# Patient Record
Sex: FEMALE | Employment: FULL TIME | URBAN - METROPOLITAN AREA
[De-identification: names, ages, dates, MRNs, and addresses within clinical notes are randomized per-mention and may not be internally consistent; named-entity substitution may affect disease eponyms.]

---

## 2021-06-02 ENCOUNTER — HOSPITAL ENCOUNTER (OUTPATIENT)
Dept: PHYSICAL THERAPY | Age: 41
Discharge: HOME | End: 2021-06-02
Payer: COMMERCIAL

## 2021-06-02 PROCEDURE — 97161 PT EVAL LOW COMPLEX 20 MIN: CPT

## 2021-06-02 PROCEDURE — 97530 THERAPEUTIC ACTIVITIES: CPT

## 2021-06-02 PROCEDURE — 97112 NEUROMUSCULAR REEDUCATION: CPT

## 2021-06-02 NOTE — PROGRESS NOTES
PF Daily Treatment Note  Patient Name: Hanna Kaye  Date:2021  []  Patient  Verified  Insurance:Payor: Darryl Hill / Plan: [address] [place] [address] [place] PPO / Product Type: PPO /   In time:9:50  Out time: 10:28  Total Treatment Time (min): 38  Total Timed Codes (min): 23  1:1 Treatment Time ( only): 38   Visit #: 1 of 12    Treatment Area: [x] Pelvic Floor     [] Other:    SUBJECTIVE  Pain Level (0-10 scale): 0/10  Any medication changes, allergies to medications, adverse drug reactions, diagnosis change, or new procedure performed?: [x] No    [] Yes (see summary sheet for update)    Ms. Hanna Kaye is a 46 y/o, F who present with c/o mixed UI. Pt reports chronic problem which aggravated about 1 year ago; pt recalled decreased frequency of exercises/work out and additional weight gain since Covid started. Pt had prolong and heavy period for about 1 month in March when MD suggested PF PT to manage UI problem. Pt reports min leakage with urgency, mostly when getting closed to her house door/bathroom door. Pt also notes some leakage with coughing/sneezing. Pt reports mostly lower back pain with most activities, denies any pelvic pain, denies numbness tingling of B LEs, and denies any bowel problem. Pelvic Floor Dysfunction Evaluation    Musculoskeletal Screen:    Skin Integrity:  [x] Healthy [x] Red  [] Labia Atrophy [] Fragile    Sensation: [x] Intact [] Diminished:    Muscle Bulk: [x] Symmetrical  [] Well-developed [] Atrophied:  []L   []R   []B    Prolapse: [] Cystocele:   [] Rectocele:    PERF Score (Performance/Endurance/Repetitions/Flicks)   P: 1 E: R: F: Total:    Patient has failed previous pelvic floor muscle training?   [] Yes    [] No    EMG Evaluation:  [] N/A [] Deferred secondary to:    Channel A: Electrode type:  [] Internal    [] Surface    [] Vaginal    [] Rectal  Channel B: Electrode location:    Baseline Resting Tone (1 minute)  Channel A (microvolts): Quality:  [] Normal [] Irradic [] Elevated  Channel B (microvolts): Slow Twitch: (10 second hold, 20 second rest)  Channel A (microvolts): Quality:[] Quick/slow rise [] Low net rise  Net rise (microvolts):   [] Slow Relaxation [] Incoordination       [] Unable to contract [] Fatigues at (sec):       [] Elevated baseline between contractions    Channel B (microvolts): Use of Accessory Muscles: [] Minimal  Net rise (microvolts):   [] Moderate  [] Excessive       [] No use of accessory muscles    Fast Twitch (3 second hold, 10 second rest)  Channel A (microvolts): Quality:[] Quick/slow rise [] Low net rise  Net rise (microvolts):   [] Slow Relaxation [] Incoordination       [] Unable to contract [] Fatigues at (sec):       [] Elevated baseline between contractions    Channel B (microvolts): Use of Accessory Muscles: [] Minimal  Net rise (microvolts):   [] Moderate  [] Excessive       [] No use of accessory muscles    Optional Tests:  Lower abdominal strength: /5    Comments/Additional Tests:      OBJECTIVE    15 eval       15 min Therapeutic Activity:  []  See flow sheet :Pt edu within scope of practice on prognosis, POC, PF muscles anatomy/physiology, PF PT,  healthy bladder function, bladder irritants. Rationale: Improve quality of pelvic floor contractions, Decrease resting tone of the pelvic floor, Increase tissue extensibility of the pelvic floor muscles, Inhibit abnormal muscle activity and Improve lumbosacral and coccygeal mobility in order to Increase urinary continence, Decrease urinary urgency, Increase ability to delay urination, Decrease frequency of urination, Decrease nocturia, Decrease bowel urgency, Improve frequency and ease of bowel movements, Improve ability to engage in sexual intercourse, Improve ability to undergo a gynecological exam and Improve ability to perform ADLs. 8 min Neuromuscular Re-education:  []  See flow sheet :quality PF muscles contraction, urge management.     Rationale: Improve quality of pelvic floor contractions, Decrease resting tone of the pelvic floor, Increase tissue extensibility of the pelvic floor muscles, Inhibit abnormal muscle activity and Improve lumbosacral and coccygeal mobility in order to Increase urinary continence, Decrease urinary urgency, Increase ability to delay urination, Decrease frequency of urination, Decrease nocturia, Decrease bowel urgency, Improve frequency and ease of bowel movements, Improve ability to engage in sexual intercourse, Improve ability to undergo a gynecological exam and Improve ability to perform ADLs. min Patient Education: [x] Review HEP    [] Progressed/Changed HEP based on:   [] positioning   [] body mechanics   [] transfers   [] heat/ice application        Other Objective/Functional Measures:   []baseline resting tone:   []slow twitch mms   []fast twitch mms    Pain Level (0-10 scale) post treatment: 0/10    ASSESSMENT/Changes in Function: see POC please    []  Decrease # of leaks   [] No change []  Improving [] Resolved     []  Decrease hypertonus [] No change []  Improving [] Resolved     []  Increase void interval [] No change []  Improving [] Resolved     []  Increase PF strength [] No change []  Improving [] Resolved     []  Increase PF endurance [] No change []  Improving [] Resolved     []  Increase endurance [] No change []  Improving [] Resolved     []  Decrease # of pads [] No change []  Improving [] Resolved     []  Decrease pain [] No change []  Improving [] Resolved       Patient will continue to benefit from skilled PT services to modify and progress therapeutic interventions, address functional mobility deficits, address ROM deficits, address strength deficits, analyze and address soft tissue restrictions, analyze and cue movement patterns, analyze and modify body mechanics/ergonomics, assess and modify postural abnormalities and instruct in home and community integration to attain remaining goals.      [x]  See Plan of Care         PLAN  []  Upgrade activities as tolerated     []  Continue plan of care  []  Update interventions per flow sheet       []  Discharge due to:_  []  Other:_      Jose Ballard, PT 6/2/2021  9:53 AM

## 2021-06-02 NOTE — PROGRESS NOTES
[facility]  [facility]  [place]  [address] [place] [address]  [phone] fax    Plan of Care/ Statement of Necessity for Physical Therapy Services    Patient name: Edis Hernandez Start of Care: 2021   Referral source: Valerio Urrutia NP : 1980    Medical Diagnosis: PFD (pelvic floor dysfunction) [M62.89]  Payor: Jose De Jesus Ponce / Plan: [place] [employer] [place] / Product Type: PPO /  Onset Date:aggravated about several months ago    Treatment Diagnosis: Mixed UI   Prior Hospitalization: see medical history Provider#: [id number]   Medications: Verified on Patient summary List    Comorbidities:  Weight change of more than 10lbs, asthma   Prior Level of Function: less leakage, ind with all mobility. .           The Plan of Care and following information is based on the information from the initial evaluation. Assessment/ bocanegra information: Ms. Edis Hernandez is a 46 y/o, F who present with c/o mixed UI. Pt reports chronic problem which aggravated about 1 year ago; pt recalled decreased frequency of exercises/work out and additional weight gain since Covid started. Pt had prolong and heavy period for about 1 month in March when MD suggested PF PT to manage UI problem. Pt reports min leakage with urgency, mostly when getting closed to her house door/bathroom door. Pt also notes some leakage with coughing/sneezing. Pt reports mostly lower back pain with most activities, denies any pelvic pain, denies numbness tingling of B LEs, and denies any bowel problem. Evaluation reveals patient with mod increased tone and poor strength of PF muscles, 1/5. She demonstrates significant challenge with coordination for appropriate contraction without Valsalva Maneuver. Also notes very mild instability of ant and post wall. PF muscles. Patient may benefit from physical therapy to address PF muscles, bladder and bowel function to improve her QOL.     Evaluation Complexity History MEDIUM  Complexity : 1-2 comorbidities / personal factors will impact the outcome/ POC ; Examination LOW Complexity : 1-2 Standardized tests and measures addressing body structure, function, activity limitation and / or participation in recreation  ;Presentation MEDIUM Complexity : Evolving with changing characteristics  ; Clinical Decision Making MEDIUM Complexity : FOTO score of 26-74  Overall Complexity Rating: LOW     Problem List: Pelvic pain/dysfunction, Decreased pelvic floor mm awareness, Decreased pelvic floor mm strength, Use of accessory muscles, Improper voiding habits, Hypertonus of pelvic floor and Urinary urgency    Treatment Plan may include any combination of the following:   Therapeutic exercise, Urge suppression techniques, Neuromuscular re-education, Manual therapy, Physical agent/modality and Patient education  Patient / Family readiness to learn indicated by: asking questions, trying to perform skills and interest    Persons(s) to be included in education: patient (P)    Barriers to Learning/Limitations: None    Patient Goal (s): stronger bladder and control    Patient Self Reported Health Status: fair    Rehabilitation Potential: good    Short Term Goals: To be accomplished in 4 weeks:  1. Patient will demonstrate accurate performance of home exercise program/pelvic floor contractions as adjunct to physical therapy clinic visits to promote healthy lifestyle and improved quality of life. Eval status: good understanding     2. Patient will Complete Bladder Diary for use in patient education and goal setting. Eval status: will be given for monitoring     3. Patient will have decreased pad usage to 1x/day max for increased patient comfort and increased quality of life. Eval status: 1-2 minipads everyday     Long Term Goals: To be accomplished in 8  weeks:  1. Patient will demonstrate independence in HEP for maintenance of pelvic floor program and improved quality of life. Eval status: good understanding     2. Patient will have decreased leakage episodes to <1-2 per week for increased quality of life. Eval status: multiple times a week, mostly due to urgency, mild with sneezing/coughin      3. Patient will have increased pelvic floor muscle motor performance by 1/2 to 1 grade for improved urinary continence and quality of life. Eval status: 1/5     4. Patient will have FOTO Urinary Problem score change of 11 points or more indicating improvement in function for icreased quality of life. Eval status: 49    Frequency / Duration: Patient to be seen 1-2 times per week for 8 weeks. Patient/ Caregiver education and instruction: Diagnosis, prognosis, Proper Voiding Habits, Diet, Pain Management, Exercises and Bladder Retraining      Beth Ridley, PT 6/2/2021 9:53 AM    ________________________________________________________________________    I certify that the above Therapy Services are being furnished while the patient is under my care. I agree with the treatment plan and certify that this therapy is necessary.     [de-identified] Signature:____________Date:_________TIME:________     [facility], NP  ** Signature, Date and Time must be completed for valid certification **      Please sign and return to [facility]  [facility]   [address] [facility]  [phone] fax

## 2021-06-07 ENCOUNTER — HOSPITAL ENCOUNTER (OUTPATIENT)
Dept: PHYSICAL THERAPY | Age: 41
Discharge: HOME | End: 2021-06-07
Payer: COMMERCIAL

## 2021-06-07 PROCEDURE — 97530 THERAPEUTIC ACTIVITIES: CPT

## 2021-06-07 PROCEDURE — 97112 NEUROMUSCULAR REEDUCATION: CPT

## 2021-06-07 NOTE — PROGRESS NOTES
PF DAILY TREATMENT NOTE 3-16    Patient Name: Jeffrey Pressley  Date:2021  : 1980  [x]  Patient  Verified  Payor: Emily Huddleston / [place] [employer] [place] / Product Type: PPO /    In time: 11:28  Out time: 12:00  Total Treatment Time (min): 32  Visit #: 2 of 12    Treatment Area: [x] Pelvic Floor     [] Other:    SUBJECTIVE  Pain Level (0-10 scale): 1/10  Any medication changes, allergies to medications, adverse drug reactions, diagnosis change, or new procedure performed?: [x] No    [] Yes (see summary sheet for update)  Subjective functional status/changes:   [] No changes reported  Pt reports having difficulty coordinating PF muscles contraction partially due to her menstrual cycle. Pt has a little soreness with her back today. OBJECTIVE     23 min Therapeutic Activity:  [x]  See flow sheet :    []  Increase Tissue extensibility        [x]  Assess fiber intake    [x]  Assess voiding habits  [x]  Assess bowel habits  [x]  Other: urge management, healthy bladder habit  Rationale: Improve quality of pelvic floor contractions, Decrease resting tone of the pelvic floor, Increase tissue extensibility of the pelvic floor muscles and Inhibit abnormal muscle activity in order to Increase urinary continence, Decrease urinary urgency, Increase ability to delay urination, Decrease frequency of urination, Decrease nocturia, Improve ability to engage in sexual intercourse, Improve ability to undergo a gynecological exam and Improve ability to perform ADLs.       9 min Neuromuscular Re-education:  [x]  See flow sheet :   []  Pelvic floor strengthening                 []  Pelvic floor downtraining  [x]  Quality pelvic floor contractions       [x]  Relaxation techniques  [x]  Urge suppression exercises  []  Other:  Rationale:  Improve quality of pelvic floor contractions, Decrease resting tone of the pelvic floor, Increase tissue extensibility of the pelvic floor muscles and Inhibit abnormal muscle activity in order to Increase urinary continence, Decrease urinary urgency, Increase ability to delay urination, Decrease frequency of urination, Decrease nocturia, Improve ability to engage in sexual intercourse, Improve ability to undergo a gynecological exam and Improve ability to perform ADLs. With   [] TE   [] TA   [] neuro  [] manual   [] other: Patient Education: [x] Review HEP    [] Progressed/Changed HEP based on:   [] positioning   [] body mechanics   [] transfers   [] heat/ice application    [] other:      Other Objective/Functional Measures:   []baseline resting tone:   []slow twitch mms   []fast twitch mms    Pain Level (0-10 scale) post treatment: 1/10    ASSESSMENT/Changes in Function: pt reports that she has to exaggerate exhalation to remind to breath during PF muscles contraction. She demonstrates good understanding of urge suppression and voiding mechanics after education. Will cont with down training next visit.      []  Decrease # of leaks   [] No change []  Improving [] Resolved     []  Decrease hypertonus [] No change []  Improving [] Resolved     []  Increase void interval [] No change []  Improving [] Resolved     []  Increase PF strength [] No change []  Improving [] Resolved     []  Increase PF endurance [] No change []  Improving [] Resolved     []  Increase endurance [] No change []  Improving [] Resolved     []  Decrease # of pads [] No change []  Improving [] Resolved     []  Decrease pain [] No change []  Improving [] Resolved     []  Increased coordination [] No change []  Improving [] Resolved     []  Increased Bowel Frequency [] No change []  Improving [] Resolved       Patient will continue to benefit from skilled PT services to modify and progress therapeutic interventions, address functional mobility deficits, address ROM deficits, address strength deficits, analyze and address soft tissue restrictions, analyze and cue movement patterns, analyze and modify body mechanics/ergonomics, assess and modify postural abnormalities and instruct in home and community integration to attain remaining goals. [x]  See Plan of Care  []  See progress note/recertification  []  See Discharge Summary         Progress towards goals / Updated goals:  Short Term Goals: To be accomplished in 4 weeks:  1. Patient will demonstrate accurate performance of home exercise program/pelvic floor contractions as adjunct to physical therapy clinic visits to promote healthy lifestyle and improved quality of life. Eval status: good understanding     2. Patient will Complete Bladder Diary for use in patient education and goal setting. Eval status: will be given for monitoring     3. Patient will have decreased pad usage to 1x/day max for increased patient comfort and increased quality of life. Eval status: 1-2 minipads everyday     Long Term Goals: To be accomplished in 8  weeks:  1. Patient will demonstrate independence in HEP for maintenance of pelvic floor program and improved quality of life. Eval status: good understanding     2. Patient will have decreased leakage episodes to <1-2 per week for increased quality of life. Eval status: multiple times a week, mostly due to urgency, mild with sneezing/coughin      3. Patient will have increased pelvic floor muscle motor performance by 1/2 to 1 grade for improved urinary continence and quality of life. Eval status: 1/5     4. Patient will have FOTO Urinary Problem score change of 11 points or more indicating improvement in function for icreased quality of life.   Eval status: 49    PLAN  [x]  Upgrade activities as tolerated     [x]  Continue plan of care  []  Update interventions per flow sheet       []  Discharge due to:_  []  Other:_      Ju Watts, PT 6/7/2021  8:11 AM    Future Appointments   Date Time Provider Hemalatha Watson   6/7/2021 11:15 AM Mare Faustin MMCPTPB [facility]   6/10/2021  8:15 AM Shashi Valerio XOLFSSHEMAR [facility]   6/16/2021  6:00 PM Mare Faustin AMDDJKA [facility]   6/21/2021  9:00 AM Laya Vidya MMCPTPB [facility]   6/28/2021  8:15 AM Angel COUCH MMCPTPB [facility]   7/6/2021  4:30 PM Laya Vidya GBKHPZN [facility]   7/12/2021  8:15 AM Laya Dasilva MMCPTPB [facility]   7/21/2021  8:15 AM Laya Dasilva MMCPTPB [facility]   7/26/2021  8:15 AM Leena Lewis MMCPTPB [facility]

## 2021-06-10 ENCOUNTER — APPOINTMENT (OUTPATIENT)
Dept: PHYSICAL THERAPY | Age: 41
End: 2021-06-10
Payer: COMMERCIAL

## 2021-06-16 ENCOUNTER — HOSPITAL ENCOUNTER (OUTPATIENT)
Dept: PHYSICAL THERAPY | Age: 41
Discharge: HOME | End: 2021-06-16
Payer: COMMERCIAL

## 2021-06-16 PROCEDURE — 97112 NEUROMUSCULAR REEDUCATION: CPT

## 2021-06-16 PROCEDURE — 97140 MANUAL THERAPY 1/> REGIONS: CPT

## 2021-06-16 PROCEDURE — 97530 THERAPEUTIC ACTIVITIES: CPT

## 2021-06-16 NOTE — PROGRESS NOTES
PF DAILY TREATMENT NOTE 3    Patient Name: Gerome Holter  Date:2021  : 1980  [x]  Patient  Verified  Payor: Kim Vance / Plan: [place] [employer] [place] / Product Type: PPO /    In time: 5:18  Out time:6:07  Total Treatment Time (min): 52  Visit #: 3 of 12    Treatment Area: [x] Pelvic Floor     [] Other:    SUBJECTIVE  Pain Level (0-10 scale): 0/10  Any medication changes, allergies to medications, adverse drug reactions, diagnosis change, or new procedure performed?: [x] No    [] Yes (see summary sheet for update)  Subjective functional status/changes:   [] No changes reported  Pt had active menstrual cycle with some cramping/spasm at this time. She couldn't perform PF coordination as much during the last several days. No pain but some pressure like \"indigestion\" feeling with her abdominal region.      OBJECTIVE    Modality rationale: decrease pain and increase tissue extensibility to improve the patients ability to tolerate ADLs   Min Type Additional Details    [] Estim:  []Unatt       []IFC  []Premod                        []Other:  []w/ice   []w/heat  Position:  Location:    [] Estim: []Att    []TENS instruct  []NMES                    []Other:  []w/US   []w/ice   []w/heat  Position:  Location:    []  Ultrasound: []Continuous   [] Pulsed                           []1MHz   []3MHz Position:  Location:   15 (10 min during MT) []  Ice     [x]  heat  []  Ice massage  []  Laser   []  Anodyne Position:  Location:   [] Skin assessment post-treatment:  []intact []redness- no adverse reaction    []redness  adverse reaction:       10 min Therapeutic Activity:  [x]  See flow sheet :    []  Increase Tissue extensibility        [x]  Assess fiber intake    [x]  Assess voiding habits  [x]  Assess bowel habits  [x]  Other: voiding interval, HEP updated   Rationale: Improve quality of pelvic floor contractions, Decrease resting tone of the pelvic floor, Increase tissue extensibility of the pelvic floor muscles, Inhibit abnormal muscle activity and Improve lumbosacral and coccygeal mobility in order to Increase urinary continence, Decrease urinary urgency, Increase ability to delay urination, Decrease frequency of urination, Decrease nocturia and Improve ability to perform ADLs. 27 min Neuromuscular Re-education:  [x]  See flow sheet :   []  Pelvic floor strengthening                 []  Pelvic floor downtraining  [x]  Quality pelvic floor contractions       [x]  Relaxation techniques  [x]  Urge suppression exercises  []  Other:  Rationale:  Improve quality of pelvic floor contractions, Decrease resting tone of the pelvic floor, Increase tissue extensibility of the pelvic floor muscles, Inhibit abnormal muscle activity and Improve lumbosacral and coccygeal mobility in order to Increase urinary continence, Decrease urinary urgency, Increase ability to delay urination, Decrease frequency of urination, Decrease nocturia and Improve ability to perform ADLs. 8 min Manual Therapy:  Abdominal MFR and massage   Rationale: Decrease resting tone of the pelvic floor, Inhibit abnormal muscle activity and abnormal pressure in order to Decrease urinary urgency and Improve ability to perform ADLs. The manual therapy interventions were performed at a separate and distinct time from the therapeutic activities interventions. With   [] TE   [] TA   [] neuro  [] manual   [] other: Patient Education: [x] Review HEP    [] Progressed/Changed HEP based on:   [] positioning   [] body mechanics   [] transfers   [] heat/ice application    [] other:      Other Objective/Functional Measures:   []baseline resting tone:   []slow twitch mms   []fast twitch mms   Improved ease with PF muscles coordination and contraction with hip add    Pain Level (0-10 scale) post treatment: 0/10    ASSESSMENT/Changes in Function: Pt reports limited progression partially due to persistent menstrual cycles.  She demonstrates good PF muscles coordination, good form and understanding with updated HEP. Improved abdominal pain/pressure with MT. Will cont to progress with internal manual and PF muscles down training as tolerated. []  Decrease # of leaks   [] No change []  Improving [] Resolved     []  Decrease hypertonus [] No change []  Improving [] Resolved     []  Increase void interval [] No change []  Improving [] Resolved     []  Increase PF strength [] No change []  Improving [] Resolved     []  Increase PF endurance [] No change []  Improving [] Resolved     []  Increase endurance [] No change []  Improving [] Resolved     []  Decrease # of pads [] No change []  Improving [] Resolved     []  Decrease pain [] No change []  Improving [] Resolved     []  Increased coordination [] No change []  Improving [] Resolved     []  Increased Bowel Frequency [] No change []  Improving [] Resolved       Patient will continue to benefit from skilled PT services to modify and progress therapeutic interventions, address functional mobility deficits, address ROM deficits, address strength deficits, analyze and address soft tissue restrictions, analyze and cue movement patterns, analyze and modify body mechanics/ergonomics, assess and modify postural abnormalities and instruct in home and community integration to attain remaining goals. [x]  See Plan of Care  []  See progress note/recertification  []  See Discharge Summary         Progress towards goals / Updated goals:  Short Term Goals: To be accomplished in 4 weeks:  1. Patient will demonstrate accurate performance of home exercise program/pelvic floor contractions as adjunct to physical therapy clinic visits to promote healthy lifestyle and improved quality of life. Eval status: good understanding  Current: fair compliance due to pain/discomfort with prolonged menstrual cycle 6-16-21     2. Patient will Complete Bladder Diary for use in patient education and goal setting. Eval status: will be given for monitoring     3.  Patient will have decreased pad usage to 1x/day max for increased patient comfort and increased quality of life. Eval status: 1-2 minipads everyday     Long Term Goals: To be accomplished in 8  weeks:  1. Patient will demonstrate independence in HEP for maintenance of pelvic floor program and improved quality of life. Eval status: good understanding     2. Patient will have decreased leakage episodes to <1-2 per week for increased quality of life. Eval status: multiple times a week, mostly due to urgency, mild with sneezing/coughin      3. Patient will have increased pelvic floor muscle motor performance by 1/2 to 1 grade for improved urinary continence and quality of life. Eval status: 1/5     4. Patient will have FOTO Urinary Problem score change of 11 points or more indicating improvement in function for icreased quality of life.   Eval status: 49    PLAN  [x]  Upgrade activities as tolerated     [x]  Continue plan of care  []  Update interventions per flow sheet       []  Discharge due to:_  []  Other:_      Dean Ying, PT 6/16/2021  9:31 AM    Future Appointments   Date Time Provider Hemalatha Watson   6/16/2021  6:00 PM Tyree Stapler [id number] [facility]   6/21/2021  9:00 AM Tyree Stapler [id number]   6/28/2021  8:15 AM Tyree Stapler [id number]   7/6/2021  4:30 PM Tyree Marceloler [id number] [facility]   7/12/2021  8:15 AM Tyree Stapler [id number] [facility]   7/21/2021  8:15 AM Shashi Rosas [id number] [facility]   7/26/2021  8:15 AM Tyree Marceloler [id number] [facility]

## 2021-06-21 ENCOUNTER — HOSPITAL ENCOUNTER (OUTPATIENT)
Dept: PHYSICAL THERAPY | Age: 41
Discharge: HOME | End: 2021-06-21
Payer: COMMERCIAL

## 2021-06-21 PROCEDURE — 97140 MANUAL THERAPY 1/> REGIONS: CPT

## 2021-06-21 PROCEDURE — 97110 THERAPEUTIC EXERCISES: CPT

## 2021-06-21 PROCEDURE — 97530 THERAPEUTIC ACTIVITIES: CPT

## 2021-06-21 NOTE — PROGRESS NOTES
PF DAILY TREATMENT NOTE 3-16    Patient Name: Vince Russo  Date:2021  : 1980  [x]  Patient      Payor: Millie Tamez / Plan: [place] [employer] [place] / Product Type: PPO /    In time: 9:04 Out time:9:44  Total Treatment Time (min): 40  Visit #: 4 of 12    Treatment Area: [x] Pelvic Floor     [] Other:    SUBJECTIVE  Pain Level (0-10 scale): 0/10  Any medication changes, allergies to medications, adverse drug reactions, diagnosis change, or new procedure performed?: [x] No    [] Yes (see summary sheet for update)  Subjective functional status/changes:   [] No changes reported  Pt reports usual voiding interval is 3-4 hours; urinary urgency occurs 1x every 2 days. She reports WNL with bowel and good compliance with HEP. OBJECTIVE      10 min Therapeutic Exercise:  [x] See flow sheet :   [x]  Pelvic floor strengthening                 [x]  Pelvic floor downtraining  []  Quality pelvic floor contractions       [x]  Relaxation techniques  [x]  Urge suppression exercises  []  Other:  Rationale: Increase pelvic floor muscle strength, Improve quality of pelvic floor contractions, Decrease resting tone of the pelvic floor, Increase tissue extensibility of the pelvic floor muscles, Inhibit abnormal muscle activity and Improve lumbosacral and coccygeal mobility in order to Increase urinary continence, Decrease urinary urgency, Increase ability to delay urination and Improve ability to perform ADLs.        15 min Therapeutic Activity:  []  See flow sheet :    []  Increase Tissue extensibility        [x]  Assess fiber intake    [x]  Assess voiding habits  [x]  Assess bowel habits  [x]  Other: HEP review   Rationale: Increase pelvic floor muscle strength, Improve quality of pelvic floor contractions, Decrease resting tone of the pelvic floor, Increase tissue extensibility of the pelvic floor muscles, Inhibit abnormal muscle activity and Improve lumbosacral and coccygeal mobility in order to Increase urinary continence, Decrease urinary urgency, Increase ability to delay urination and Improve ability to perform ADLs. 10 min Manual Therapy:  Intra-vaginal MFR    Rationale: Decrease resting tone of the pelvic floor, Increase tissue extensibility of the pelvic floor muscles, Increase core strength, Inhibit abnormal muscle activity and Improve lumbosacral and coccygeal mobility in order to Increase urinary continence, Decrease urinary urgency, Increase ability to delay urination, Decrease frequency of urination and Improve ability to perform ADLs. The manual therapy interventions were performed at a separate and distinct time from the therapeutic activities interventions. With   [] TE   [] TA   [] neuro  [] manual   [] other: Patient Education: [x] Review HEP    [] Progressed/Changed HEP based on:   [] positioning   [] body mechanics   [] transfers   [] heat/ice application    [] other:      Other Objective/Functional Measures:   []baseline resting tone:   []slow twitch mms   []fast twitch mms    Pain Level (0-10 scale) post treatment: 0/10    ASSESSMENT/Changes in Function: pt making good progression with improving voiding interval, improving urgency gradually and improving tolerance for PF muscles exercises (increasing holding time and coordination). Will cont with strengthening and stretching therex next visit.      []  Decrease # of leaks   [] No change []  Improving [] Resolved     []  Decrease hypertonus [] No change []  Improving [] Resolved     []  Increase void interval [] No change []  Improving [] Resolved     []  Increase PF strength [] No change []  Improving [] Resolved     []  Increase PF endurance [] No change []  Improving [] Resolved     []  Increase endurance [] No change []  Improving [] Resolved     []  Decrease # of pads [] No change []  Improving [] Resolved     []  Decrease pain [] No change []  Improving [] Resolved     []  Increased coordination [] No change []  Improving [] Resolved     [] Increased Bowel Frequency [] No change []  Improving [] Resolved       Patient will continue to benefit from skilled PT services to modify and progress therapeutic interventions, address functional mobility deficits, address ROM deficits, address strength deficits, analyze and address soft tissue restrictions, analyze and cue movement patterns, analyze and modify body mechanics/ergonomics, assess and modify postural abnormalities and instruct in home and community integration to attain remaining goals. [x]  See Plan of Care  []  See progress note/recertification  []  See Discharge Summary         Progress towards goals / Updated goals:  Short Term Goals: To be accomplished in 4 weeks:  1. Patient will demonstrate accurate performance of home exercise program/pelvic floor contractions as adjunct to physical therapy clinic visits to promote healthy lifestyle and improved quality of life. Eval status: good understanding  Current: fair compliance due to pain/discomfort with prolonged menstrual cycle 6-16-21     2. Patient will Complete Bladder Diary for use in patient education and goal setting. Eval status: will be given for monitoring     3. Patient will have decreased pad usage to 1x/day max for increased patient comfort and increased quality of life. Eval status: 1-2 minipads everyday     Long Term Goals: To be accomplished in 8  weeks:  1. Patient will demonstrate independence in HEP for maintenance of pelvic floor program and improved quality of life. Eval status: good understanding     2. Patient will have decreased leakage episodes to <1-2 per week for increased quality of life. Eval status: multiple times a week, mostly due to urgency, mild with sneezing/coughin      3. Patient will have increased pelvic floor muscle motor performance by 1/2 to 1 grade for improved urinary continence and quality of life. Eval status: 1/5  Current: improving coordination and holding time 6-21-21     4.  Patient will have FOTO Urinary Problem score change of 11 points or more indicating improvement in function for icreased quality of life.   Eval status: 49    PLAN  [x]  Upgrade activities as tolerated     [x]  Continue plan of care  []  Update interventions per flow sheet       []  Discharge due to:_  []  Other:_      Dale De Dios, PT 6/21/2021  8:06 AM    Future Appointments   Date Time Provider Hemalatha Watson   6/21/2021  9:00 AM Angela Bounds [id number]   6/28/2021  8:15 AM Carolynn COUCH [facility]CPTP[facility]   7/6/2021  4:30 PM Angela Bounds [facility]CPTPB [facility]   7/12/2021  8:15 AM Angela Bounds MMCPTPB [facility]   7/21/2021  8:15 AM Shashi Bazzi [id number] [facility]   7/26/2021  8:15 AM Angela Bounds [id number] [facility]

## 2021-06-28 ENCOUNTER — HOSPITAL ENCOUNTER (OUTPATIENT)
Dept: PHYSICAL THERAPY | Age: 41
Discharge: HOME | End: 2021-06-28
Payer: COMMERCIAL

## 2021-06-28 PROCEDURE — 97530 THERAPEUTIC ACTIVITIES: CPT

## 2021-06-28 PROCEDURE — 90912 BFB TRAINING 1ST 15 MIN: CPT

## 2021-06-28 PROCEDURE — 90913 BFB TRAINING EA ADDL 15 MIN: CPT

## 2021-06-28 NOTE — PROGRESS NOTES
PF DAILY TREATMENT NOTE 3-16    Patient Name: Fany Rendon  Date:2021  : 1980  [x]  Patient  Verified  Payor: Cain Call / Plan: [place] [employer] / Product Type: PPO /    In time: 8:16  Out time: 8:59  Total Treatment Time (min): 43  Visit #: 5 of 12    Treatment Area: [x] Pelvic Floor     [] Other:    SUBJECTIVE  Pain Level (0-10 scale): 0/10  Any medication changes, allergies to medications, adverse drug reactions, diagnosis change, or new procedure performed?: [] No    [] Yes (see summary sheet for update)  Subjective functional status/changes:   [] No changes reported  Pt reports that she feels like her urgency occurs more when she's in the bathroom.      OBJECTIVE    Modality rationale: increase tissue extensibility and increase muscle contraction/control to improve the patients ability to improve urge UI symptoms   Min Type Additional Details    [] Estim:  []Unatt       []IFC  []Premod                        []Other:  []w/ice   []w/heat  Position:  Location:   30 [x] Estim: []Att    []TENS instruct  []NMES                    []Other: Biofeedback []w/US   []w/ice   []w/heat  Position: supine, wedge for B LEs  Location: external sensor for PF muscles    []  Ultrasound: []Continuous   [] Pulsed                           []1MHz   []3MHz Position:  Location:    []  Ice     []  heat  []  Ice massage  []  Laser   []  Anodyne Position:  Location:   [] Skin assessment post-treatment:  []intact []redness- no adverse reaction    []redness  adverse reaction:     13 min Therapeutic Activity:  []  See flow sheet :    []  Increase Tissue extensibility        [x]  Assess fiber intake    [x]  Assess voiding habits  [x]  Assess bowel habits  []  Other:   Rationale: Increase pelvic floor muscle strength, Improve quality of pelvic floor contractions, Decrease resting tone of the pelvic floor, Increase tissue extensibility of the pelvic floor muscles, Inhibit abnormal muscle activity and Improve lumbosacral and coccygeal mobility in order to Increase urinary continence, Decrease urinary urgency, Increase ability to delay urination, Decrease frequency of urination, Decrease nocturia and Improve ability to perform ADLs. With   [] TE   [] TA   [] neuro  [] manual   [] other: Patient Education: [x] Review HEP    [] Progressed/Changed HEP based on:   [] positioning   [] body mechanics   [] transfers   [] heat/ice application    [] other:      Other Objective/Functional Measures:   []baseline resting tone: 2.8 µV  []slow twitch mms 4 sec hold, 10 sec rest, 5 rep: work: 15.8 µV, rest: 3.6 µV  []fast twitch mms    Pain Level (0-10 scale) post treatment: 0/10    ASSESSMENT/Changes in Function: see Progress Note please      []  Decrease # of leaks   [] No change []  Improving [] Resolved     []  Decrease hypertonus [] No change []  Improving [] Resolved     []  Increase void interval [] No change []  Improving [] Resolved     []  Increase PF strength [] No change []  Improving [] Resolved     []  Increase PF endurance [] No change []  Improving [] Resolved     []  Increase endurance [] No change []  Improving [] Resolved     []  Decrease # of pads [] No change []  Improving [] Resolved     []  Decrease pain [] No change []  Improving [] Resolved     []  Increased coordination [] No change []  Improving [] Resolved     []  Increased Bowel Frequency [] No change []  Improving [] Resolved       Patient will continue to benefit from skilled PT services to modify and progress therapeutic interventions, address functional mobility deficits, address ROM deficits, address strength deficits, analyze and address soft tissue restrictions, analyze and cue movement patterns, analyze and modify body mechanics/ergonomics, assess and modify postural abnormalities and instruct in home and community integration to attain remaining goals.      []  See Plan of Care  [x]  See progress note/recertification  []  See Discharge Summary Progress towards goals / Updated goals:  Short Term Goals: To be accomplished in 4 weeks:  1. Patient will demonstrate accurate performance of home exercise program/pelvic floor contractions as adjunct to physical therapy clinic visits to promote healthy lifestyle and improved quality of life. Eval status: good understanding  Current: fair compliance due to pain/discomfort with prolonged menstrual cycle 6-16-21     2. Patient will Complete Bladder Diary for use in patient education and goal setting. Eval status: will be given for monitoring  Current: not met 6-28-21     3. Patient will have decreased pad usage to 1x/day max for increased patient comfort and increased quality of life. Eval status: 1-2 minipads everyday  Current: no change with number of pads but notes more of discharge than leakage 6-28-21     Long Term Goals: To be accomplished in 8  weeks:  1. Patient will demonstrate independence in HEP for maintenance of pelvic floor program and improved quality of life. Eval status: good understanding  Current: Good compliance 6-28-21      2. Patient will have decreased leakage episodes to <1-2 per week for increased quality of life. Eval status: multiple times a week, mostly due to urgency, mild with sneezing/coughin g  Current: no leakage with coughing/sneezing 6-28-21     3. Patient will have increased pelvic floor muscle motor performance by 1/2 to 1 grade for improved urinary continence and quality of life. Eval status: 1/5  Current: improving coordination and holding time 6-21-21     4. Patient will have FOTO Urinary Problem score change of 11 points or more indicating improvement in function for icreased quality of life.   Eval status: 49  Current: met 6-28-21     PLAN  [x]  Upgrade activities as tolerated     [x]  Continue plan of care  []  Update interventions per flow sheet       []  Discharge due to:_  []  Other:_      Leonardo Solis, PT 6/28/2021  8:08 AM    Future Appointments   Date Time Katelynn Watson   6/28/2021  8:15 AM Wilhelminia Harps [id number] [facility]   7/6/2021  4:30 PM Wilhelminia Harps [id number] [facility]   7/12/2021  8:15 AM Wilhelminia Harps [id number] [facility]   7/21/2021  8:15 AM Wilhelminia Harps [id number] [facility]   7/26/2021  8:15 AM Wilhelminia Harps [id number] [facility]

## 2021-06-28 NOTE — PROGRESS NOTES
[facility] Joceline Velasquez  [address] [place] [address]  [phone] fax    Pelvic Floor Progress Note  Patient name: Brenda Gonzalez Start of Care: 2021   Referral source: Vito Mata NP : 1980               Medical Diagnosis: PFD (pelvic floor dysfunction) [M62.89]  Payor: Gail Greer / Plan: [place] [employer] / Product Type: PPO /  Onset Date:aggravated about several months ago               Treatment Diagnosis: Mixed UI   Prior Hospitalization: see medical history Provider#: [id number]   Medications: Verified on Patient summary List    Comorbidities:  Weight change of more than 10lbs, asthma   Prior Level of Function: less leakage, ind with all mobility. .                                                                                     Visits from Start of Care: 5    Missed Visits: 1    Established Goals:           Excellent Good         Limited           None  [x] Increase Pelvic MM strength []  []  [x]  []  [] Decrease Pelvic MM hypertonus []  []  []  []  [x] Decrease Incontinence Episodes []  [x]  []  []   [x] Improve Voiding Habits  []  [x]  []  []  [x] Decreased Urgency   []  [x]  []  []    Key Functional Changes: improving urgency, improivng voiding interval, improving leakage with coughing     Updated Goals: to be achieved in 4 weeks:  1. Patient will demonstrate independence in HEP for maintenance of pelvic floor program and improved quality of life. Status at Progress Note: Good compliance 21      2. Patient will have decreased leakage episodes to <1-2 per week for increased quality of life. Status at Progress Note: multiple times a week, mostly due to urgency, mild with sneezing/coughing; no leakage with coughing/sneezing 21     3. Patient will have increased pelvic floor muscle motor performance by 1/2 to 1 grade for improved urinary continence and quality of life.   Status at Progress Note: 1/5; improving coordination and holding time 6-21-21       ASSESSMENT/RECOMMENDATIONS: Pt making good progress with PT. She reports improving urgency, being able to maintain 2-3 hour voiding interval most of the time and noting no urinary leakage with coughing during the last 7-10 days. Pt cont to be limited with elevated tone, decreased strength and endurance of PF muscles. She would cont to benefit from skilled PF PT to improve urge UI symptoms for improved QOL. [x]Continue therapy per initial plan/protocol at a frequency of  1-2 x per 2 weeks for 4 weeks  []Continue therapy with the following recommended changes:_____________________      _____________________________________________________________________  []Discontinue therapy progressing towards or have reached established goals  []Discontinue therapy due to lack of appreciable progress towards goals  []Discontinue therapy due to lack of attendance or compliance  []Await Physician's recommendations/decisions regarding therapy  []Other:________________________________________________________________    Thank you for this referral.   Alena Morelos, PT 6/28/2021 9:12 AM  NOTE TO PHYSICIAN:  PLEASE COMPLETE THE ORDERS BELOW AND   FAX TO [facility]: [phone]  If you are unable to process this request in 24 hours please contact our office: [phone] I have read the above report and request that my patient continue as recommended. ? I have read the above report and request that my patient continue therapy with the following changes/special instructions:___________________________________________________________  ? I have read the above report and request that my patient be discharged from therapy.     [de-identified] Signature:____________Date:_________TIME:________     Estephania Edouard NP  ** Signature, Date and Time must be completed for valid certification **

## 2021-07-06 ENCOUNTER — HOSPITAL ENCOUNTER (OUTPATIENT)
Dept: PHYSICAL THERAPY | Age: 41
End: 2021-07-06

## 2021-07-12 ENCOUNTER — APPOINTMENT (OUTPATIENT)
Dept: PHYSICAL THERAPY | Age: 41
End: 2021-07-12

## 2021-07-21 ENCOUNTER — APPOINTMENT (OUTPATIENT)
Dept: PHYSICAL THERAPY | Age: 41
End: 2021-07-21

## 2021-07-26 ENCOUNTER — APPOINTMENT (OUTPATIENT)
Dept: PHYSICAL THERAPY | Age: 41
End: 2021-07-26

## 2021-08-26 NOTE — PROGRESS NOTES
[facility] Robi Horn  [address] [facility]  [phone] fax    Physical Therapy Discharge Summary      Patient name: Mai Chilel Adjutant of Care: 2021   Referral source: Shu Woodard NP : 1980               Medical Diagnosis: PFD (pelvic floor dysfunction) [M62.89]  Payor: Sarah Cabrera / Plan: [employer] / Product Type: PPO /  Onset Date:aggravated about several months ago               Treatment Diagnosis: Mixed UI   Prior Hospitalization: see medical history Provider#: [id number]   Medications: Verified on Patient summary List    Comorbidities:  Weight change of more than 10lbs, asthma   Prior Level of Function: less leakage, ind with all mobility. Trini Good      Visits from Start of Care: 5    Missed Visits: 1    Reporting Period : 2021    Summary of Care:  Updated Goals: to be achieved in 4 weeks:  1. Patient will demonstrate independence in HEP for maintenance of pelvic floor program and improved quality of life. Status at Progress Note: Good compliance 21   Status at discharge: not met, unplanned discharge     2. Patient will have decreased leakage episodes to <1-2 per week for increased quality of life. Status at Progress Note: multiple times a week, mostly due to urgency, mild with sneezing/coughing; no leakage with coughing/sneezing 21  Status at discharge: not met, unplanned discharge     3. Patient will have increased pelvic floor muscle motor performance by 1/2 to 1 grade for improved urinary continence and quality of life. Status at Progress Note: 1/5; improving coordination and holding time 21  Status at discharge: not met, unplanned discharge      ASSESSMENT/RECOMMENDATIONS: Pt cancelled all appointments due to work schedule and Covid but didn't report back to resume PT as planned.      [x]Discontinue therapy: []Patient has reached or is progressing toward set goals      [x]Patient is non-compliant or has abdicated      []Due to lack of appreciable progress towards set goals    Dat Lopez, PT 8/26/2021 8:25 AM